# Patient Record
Sex: FEMALE | Race: WHITE | NOT HISPANIC OR LATINO | URBAN - METROPOLITAN AREA
[De-identification: names, ages, dates, MRNs, and addresses within clinical notes are randomized per-mention and may not be internally consistent; named-entity substitution may affect disease eponyms.]

---

## 2024-09-27 ENCOUNTER — INPATIENT (INPATIENT)
Facility: HOSPITAL | Age: 24
LOS: 2 days | Discharge: HOME | DRG: 955 | End: 2024-09-30
Attending: STUDENT IN AN ORGANIZED HEALTH CARE EDUCATION/TRAINING PROGRAM | Admitting: STUDENT IN AN ORGANIZED HEALTH CARE EDUCATION/TRAINING PROGRAM
Payer: MEDICAID

## 2024-09-27 VITALS — DIASTOLIC BLOOD PRESSURE: 73 MMHG | HEART RATE: 104 BPM | SYSTOLIC BLOOD PRESSURE: 118 MMHG

## 2024-09-27 DIAGNOSIS — O26.899 OTHER SPECIFIED PREGNANCY RELATED CONDITIONS, UNSPECIFIED TRIMESTER: ICD-10-CM

## 2024-09-27 LAB
ABO RH CONFIRMATION: SIGNIFICANT CHANGE UP
APPEARANCE UR: CLEAR — SIGNIFICANT CHANGE UP
BASOPHILS # BLD AUTO: 0.05 K/UL — SIGNIFICANT CHANGE UP (ref 0–0.2)
BASOPHILS NFR BLD AUTO: 0.4 % — SIGNIFICANT CHANGE UP (ref 0–1)
BILIRUB UR-MCNC: NEGATIVE — SIGNIFICANT CHANGE UP
COLOR SPEC: YELLOW — SIGNIFICANT CHANGE UP
DIFF PNL FLD: NEGATIVE — SIGNIFICANT CHANGE UP
EOSINOPHIL # BLD AUTO: 0.06 K/UL — SIGNIFICANT CHANGE UP (ref 0–0.7)
EOSINOPHIL NFR BLD AUTO: 0.5 % — SIGNIFICANT CHANGE UP (ref 0–8)
GLUCOSE UR QL: NEGATIVE MG/DL — SIGNIFICANT CHANGE UP
HCT VFR BLD CALC: 38.5 % — SIGNIFICANT CHANGE UP (ref 37–47)
HGB BLD-MCNC: 12.7 G/DL — SIGNIFICANT CHANGE UP (ref 12–16)
IMM GRANULOCYTES NFR BLD AUTO: 0.6 % — HIGH (ref 0.1–0.3)
KETONES UR-MCNC: 80 MG/DL
LEUKOCYTE ESTERASE UR-ACNC: NEGATIVE — SIGNIFICANT CHANGE UP
LYMPHOCYTES # BLD AUTO: 2.64 K/UL — SIGNIFICANT CHANGE UP (ref 1.2–3.4)
LYMPHOCYTES # BLD AUTO: 22.5 % — SIGNIFICANT CHANGE UP (ref 20.5–51.1)
MCHC RBC-ENTMCNC: 28.8 PG — SIGNIFICANT CHANGE UP (ref 27–31)
MCHC RBC-ENTMCNC: 33 G/DL — SIGNIFICANT CHANGE UP (ref 32–37)
MCV RBC AUTO: 87.3 FL — SIGNIFICANT CHANGE UP (ref 81–99)
MONOCYTES # BLD AUTO: 0.7 K/UL — HIGH (ref 0.1–0.6)
MONOCYTES NFR BLD AUTO: 6 % — SIGNIFICANT CHANGE UP (ref 1.7–9.3)
NEUTROPHILS # BLD AUTO: 8.21 K/UL — HIGH (ref 1.4–6.5)
NEUTROPHILS NFR BLD AUTO: 70 % — SIGNIFICANT CHANGE UP (ref 42.2–75.2)
NITRITE UR-MCNC: NEGATIVE — SIGNIFICANT CHANGE UP
NRBC # BLD: 0 /100 WBCS — SIGNIFICANT CHANGE UP (ref 0–0)
PH UR: 7 — SIGNIFICANT CHANGE UP (ref 5–8)
PLATELET # BLD AUTO: 232 K/UL — SIGNIFICANT CHANGE UP (ref 130–400)
PMV BLD: 11.6 FL — HIGH (ref 7.4–10.4)
PRENATAL SYPHILIS TEST: SIGNIFICANT CHANGE UP
PROT UR-MCNC: NEGATIVE MG/DL — SIGNIFICANT CHANGE UP
RBC # BLD: 4.41 M/UL — SIGNIFICANT CHANGE UP (ref 4.2–5.4)
RBC # FLD: 13.2 % — SIGNIFICANT CHANGE UP (ref 11.5–14.5)
SP GR SPEC: 1.01 — SIGNIFICANT CHANGE UP (ref 1–1.03)
UROBILINOGEN FLD QL: 0.2 MG/DL — SIGNIFICANT CHANGE UP (ref 0.2–1)
WBC # BLD: 11.73 K/UL — HIGH (ref 4.8–10.8)
WBC # FLD AUTO: 11.73 K/UL — HIGH (ref 4.8–10.8)

## 2024-09-27 PROCEDURE — 85025 COMPLETE CBC W/AUTO DIFF WBC: CPT

## 2024-09-27 PROCEDURE — 86592 SYPHILIS TEST NON-TREP QUAL: CPT

## 2024-09-27 PROCEDURE — 59050 FETAL MONITOR W/REPORT: CPT

## 2024-09-27 PROCEDURE — 36415 COLL VENOUS BLD VENIPUNCTURE: CPT

## 2024-09-27 PROCEDURE — 81003 URINALYSIS AUTO W/O SCOPE: CPT

## 2024-09-27 PROCEDURE — 80354 DRUG SCREENING FENTANYL: CPT

## 2024-09-27 PROCEDURE — 86901 BLOOD TYPING SEROLOGIC RH(D): CPT

## 2024-09-27 PROCEDURE — 86900 BLOOD TYPING SEROLOGIC ABO: CPT

## 2024-09-27 PROCEDURE — 86850 RBC ANTIBODY SCREEN: CPT

## 2024-09-27 PROCEDURE — 80307 DRUG TEST PRSMV CHEM ANLYZR: CPT

## 2024-09-27 RX ORDER — ONDANSETRON HCL/PF 4 MG/2 ML
4 VIAL (ML) INJECTION EVERY 6 HOURS
Refills: 0 | Status: DISCONTINUED | OUTPATIENT
Start: 2024-09-27 | End: 2024-09-28

## 2024-09-27 RX ORDER — HEPARIN SOD,PORK IN 0.45% NACL 5K/1000 ML
250 INTRAVENOUS SOLUTION INTRAVENOUS
Refills: 0 | Status: DISCONTINUED | OUTPATIENT
Start: 2024-09-27 | End: 2024-09-28

## 2024-09-27 RX ORDER — OXYTOCIN/RINGER'S LACTATE 20/500ML
2 PLASTIC BAG, INJECTION (ML) INTRAVENOUS
Qty: 30 | Refills: 0 | Status: DISCONTINUED | OUTPATIENT
Start: 2024-09-27 | End: 2024-09-28

## 2024-09-27 RX ORDER — NALOXONE HYDROCHLORIDE 0.4 MG/ML
0.1 INJECTION, SOLUTION INTRAMUSCULAR; INTRAVENOUS; SUBCUTANEOUS
Refills: 0 | Status: DISCONTINUED | OUTPATIENT
Start: 2024-09-27 | End: 2024-09-28

## 2024-09-27 RX ORDER — OXYTOCIN/RINGER'S LACTATE 20/500ML
0.67 PLASTIC BAG, INJECTION (ML) INTRAVENOUS
Qty: 20 | Refills: 0 | Status: DISCONTINUED | OUTPATIENT
Start: 2024-09-27 | End: 2024-09-27

## 2024-09-27 RX ORDER — SODIUM CHLORIDE IRRIG SOLUTION 0.9 %
1000 SOLUTION, IRRIGATION IRRIGATION
Refills: 0 | Status: DISCONTINUED | OUTPATIENT
Start: 2024-09-27 | End: 2024-09-28

## 2024-09-27 RX ORDER — OXYTOCIN/RINGER'S LACTATE 20/500ML
167 PLASTIC BAG, INJECTION (ML) INTRAVENOUS
Qty: 30 | Refills: 0 | Status: DISCONTINUED | OUTPATIENT
Start: 2024-09-27 | End: 2024-09-28

## 2024-09-27 NOTE — PROCEDURE NOTE - ADDITIONAL PROCEDURE DETAILS
Thorough discussion of patient's history, as indicated above.  Discussed risks of epidural, including PDPH, inadequate analgesia occasionally requiring epidural catheter replacement, bleeding, infection and spinal cord injury.  Patient expressed understanding of these risks, signed informed consent and wishes to proceed with epidural catheter insertion.  Lumbar epidural performed at L3-4. Standard ASA monitors including BP, pulse oximetry, FHR. Sterile gloves, chlorhexidine prep. 1% lidocaine for local infiltration. 17g Touhy. BABAR to Air at 8 cm.  27G Gray spinal needle inserted through epidural needle.  +CSF/Negative heme or paresthesias.  1cc of 0.25% bupivacaine injected easily.  Gray needle removed.  Epidural catheter threaded easily to 13 cm. Touhy needle removed. Catheter secured in place. Aspiration negative for blood/CSF. Test dose consisting of 3ml 1.5% lidocaine with epinephrine was negative. Patient tolerated procedure, was hemodynamically stable throughout and did not complain of pain or paresthesias. T10 level bilaterally.     PCEA: Epidural infusion consisting of 250ml 0.0625% bupivacaine with fentanyl 2mcg/ml infusing at 10ml/hr. 5cc demand dose , 15min lock out Thorough discussion of patient's history, as indicated above.  Discussed risks of epidural, including PDPH, inadequate analgesia occasionally requiring epidural catheter replacement, bleeding, infection and spinal cord injury.  Patient expressed understanding of these risks, signed informed consent and wishes to proceed with epidural catheter insertion.  Lumbar epidural performed at L3-4. Standard ASA monitors including BP, pulse oximetry, FHR. Sterile gloves, chlorhexidine prep. 1% lidocaine for local infiltration. 17g Touhy. BABAR to Air at 8 cm.  27G Gray spinal needle inserted through epidural needle.  +CSF/Negative heme or paresthesias.  1cc of 0.25% bupivacaine injected easily.  Gray needle removed.  Epidural catheter threaded easily to 13 cm. Touhy needle removed. Catheter secured in place. Aspiration negative for blood/CSF. Test dose consisting of 3ml 1.5% lidocaine with epinephrine was negative. Patient tolerated procedure, was hemodynamically stable throughout and did not complain of pain or paresthesias. T10 level bilaterally.     PCEA: Epidural infusion consisting of 250ml 0.0625% bupivacaine with fentanyl 2mcg/ml infusing at 10ml/hr. 5cc demand dose , 15min lock out    C/O   L sided pain unrelieved with epidural  Discussed redo: CSE  CSE:  Skin prepped  L2-3  17/27g  BABAR 6cm  Bupivacaine 2.5mg  Catheter threaded  6cm in epidural space  -ve aspiration  Analgesia T 10 R=L  VSS  FH 140s

## 2024-09-27 NOTE — OB RN PATIENT PROFILE - THE IMPORTANCE OF THE NEWBORN'S COMFORT AND THERMOREGULATION DURING SKIN TO SKIN: ANY PART OF INFANT SKIN NOT TOUCHING PARENT'S SKIN IS TO BE COVERED BY A BLANKET.
No showering, swimming, sleeping in contacts     Make sure to replace contact lens case and solution every 3 months          [facility]  [address] [place]  [address] [phone]                                                                                         Fax [phone]                  [facility]                                       [address] [place]  [address] [phone]    Fax [phone]    [email or web address]      CONTACT LENS PRESCRIPTION AND MANAGEMENT AGREEMENT    The best contact lens care includes quality materials and professional services provided by experienced doctors.     At [facility], we are dedicated to providing the highest level of contact lens services to our patients. For this reason, we offer complete contact lens care which includes the professional fitting services and materials. This agreement outlines the policy at [facility] for our contact lens services.      1.  The type of lenses recommended for your eyes are:     2.  The fitting charge includes the initial fitting appointment and followup visits during the adaptation period lasting up to three months.       Additional charges that may be incurred during the 3 month term of this fitting agreement:   A.  If it is determined during the adaptation period that your eyes require a more complex lens, an additional fee for the lenses and professional services will be due.    B.  If you lose or damage your contact lens(es), you will be expected to pay the full cost of a replacement lens.     3.  If contact lenses are ordered through [employer], full payment for the contact lenses will be due at time of order.     4.  REFUND POLICY:  Professional prescribing fees are non-refundable. In the event contact lenses are discontinued, the cost of the soft contact lens materials may be refunded if the boxes are  unmarked, unopened, and in acceptable condition within 60 days from the order date. No refunds will be given after that time period.     5.  After this prescription agreement expires (3 months from initial fitting of the contact lenses), you will be expected to pay for additional professional services at the time they are performed.      6.  FOLLOW-UP CARE:  Contact lens patients require special care and more frequent examinations because of the increased risk of eye infections and other complications which could lead to severe vision loss. Our office will notify you of the proper follow up required for your particular contact lens.    7.  In addition, annual comprehensive dilated eye examinations are necessary to ensure eye health. Our office requires yearly examinations be performed here to maintain a valid contact lens prescription. If an examination has not been completed within the previous 12 months, a contact lens prescription or supply will not be dispensed until a dilated examination is completed.     8.  IN THE EVENT OF AN EMERGENCY:  If you experience any of the following symptoms, you should immediately remove your contact lens and call our office at [phone], [phone], or [phone] after 5:00 p.m. and weekends.      Eye pain or unusual irritation  Cloudy or decreased vision  Redness  Sensitivity to light  Tearing or discharge  Any disturbance in vision or eye comfort    9.  RELEASE OF CONTACT LENS PRESCRIPTION:  Contact lenses are a medical device that can only be dispensed by a valid prescription. Contact lenses should be treated with the same caution as prescription medication. Eye health may be compromised with an improperly filled prescription and lack of follow-up care. We, therefore, highly recommend you receive your contacts only from [facility].      10.  CONTACT LENS FITTING ELSEWHERE:  Our office will not accept responsibility for contact lenses fit or received from any  establishment other than those from [place]. Any office visits for lenses fit or received elsewhere will be subject to the usual office visit fee.     11.  INFORMATION FOR MONOVISION CONTACT LENS WEARERS:  Monovision contacts are fit so that one eye sees clearly at distance and one eye sees clearly up close. This system of contact lens fitting will not harm your eyes. You will meet state requirements for driving; however, you should be aware driving with only one eye seeing clearly at distance may impair your ability to  distances. Your vision for driving will not be as clear as it would be if both eyes were used together. You should not drive until you have adjusted to your monovision contacts. We suggest you wear your glasses, distance contact lenses in each eye, or glasses designed to be worn over contact lenses to improve distance vision while driving.      12.  No verbal agreement or arrangements will supersede this written agreement. Any modifications to this agreement must be made in writing by a doctor or his or her designees.     CONTACT LENS CARE AND HANDLING    Proper contact lens care is necessary to minimize the risk of eye injury and vision loss and to maintain comfort.      CONTACT LENS SOLUTIONS TO BE USED FOR CLEANING AND DISINFECTION:      This product has been prescribed specifically for your lenses and eyes. Do not change or substitute brand unless you check with our office first. Use of improper solutions may result in lens damage or eye irritation.      GENERAL LENS CARE:    Basic Instructions:  Always wash and rinse your hands before handling your contact lenses. Dry your hands thoroughly with a lint free towel.    Your lenses must be BOTH cleaned and disinfected every time you remove them. Cleaning is necessary to remove build-up and film from the lens surface. The combination of CLEANING AND DISINFECTING has been shown to prevent the growth of microorganisms.   Clean one lens first  (always the same lens first to avoid mix-ups) and rinse the lens thoroughly. Follow the directions provided with the recommended cleaning solution.    Place the lens in the correct chamber of the lens storage case. Repeat the procedure for the second lens.   Disinfect your lenses using the system recommended.   To store your lenses, disinfect and leave them in the closed/unopened storage case until ready to wear.   If the lenses have been stored in the unopened storage case for more than 7 days, re-disinfect them before wearing by discarding the old solution and filling the lens chambers with fresh solution.   To prevent contamination and to help avoid serious eye injury, always empty and rinse your contact lens case after each use with fresh lens storage solution and allow to air dry. Do not reuse the solution left in your case; fresh storage solution mostly every night and. Never clean your case with water, soaps, detergents, or other cleaning agents. If the case cannot be properly cleaned, it should be replaced. Cases should be replaced every 3 months no matter what.      To Rewet a Dried Lens:    Handle the lens carefully.   Place then lens in its storage case and soak the lens in the recommended cleaning and disinfecting solution for at least 6 hours.   Clean and disinfect the rewetted (rehydrated) lens using the lens care system recommended by your eye care practitioner.   If after soaking, the lens does not become soft, DO NOT USE THE LENS, and consult our office.     Care For a Sticking Lens:  If the lens sticks (stops moving) on the eye, apply 2-3 drops of a recommended lubricating solution. If the lens continues to stick, consult your eye care practitioner.      PRECAUTIONS:      Always follow recommended lens care systems for your contact lens. Use lens care solutions recommended by our office and carefully follow the recommended directions.    Do not use hard contact lens solutions which are not  indicated for soft contact lenses.   Always use FRESH rinsing, disinfecting, and storing solutions daily.    Do not mix or alternate thermal (heat) and chemical (not heat) lens care systems.   Never use a chemical disinfecting solution with heat unless recommended in the product labeling.    Do not use saliva or anything other than the recommended solutions to wet your lenses.   Always keep the lenses completely immersed in the recommended storage solution when the lenses are not being worn. Prolonged periods of drying will dehydrate your lenses.   Do not use any water with soft contact lenses.   The lens must move freely on the eye for the continued health of the eye. If your lens sticks or does not move on the eye consult our office.   Always wash and rinse your hands before you handle your lenses. Eye irritation may result if cosmetics, lotions, soaps, creams, or deodorants come in contact with your lenses and if the lenses are contaminated by infectious or non-infectious dirt.   Avoid using aerosol products such as hair spray while wearing your lenses. If sprays are used, keep your eyes closed until the spray has settled.   DO NOT WEAR CONTACT LENSES WHILE SLEEPING.   Avoid all harmful or irritating vapors and fumes while wearing your lenses.  Do not swim with your lenses in place.   Never use tweezers or other tools to remove your lens from the storage case. Pour the lens into your hand to prevent ripping.    Do not touch the lens with your fingernails.   Always consult our office before using any eyedrops in your eyes.   Always inform your primary care physician that you wear contact lenses.   Always inform your employer that you wear contact lenses. Some jobs require the use of eye protection or require that you not wear contact lenses.      WEARING SCHEDULE:     DAY  HOURS  1.  4-6  2.  6-8  3.  8-10  4.  10-12  5.  12-14  6.   14-16  7.   16-18    Please have your contact lenses in your eyes for at least 4  hours before your next appointment. If you cannot keep this appointment, please call to reschedule.        IN THE BEGINNING IT IS NORMAL IF:     1.  Your lenses itch or feel unusual.    2.  One lens is more noticeable than the other.    3.  Your vision seems less clear than with glasses.     4.  One eye sees better than the other.     5.  You have trouble applying or removing your lenses.      REMOVE YOUR LENSES IF:      1.  You develop eye pain or irritation.     2.  You develop unusually cloudy or decreased vision.     3.  You experience a decrease in vision that does not clear up.     4.  You become sensitive to light.    5.  You experience watering or discharge from the eyes.     6.  You experience redness of the eyes.    7.  You suspect something is wrong.      If the discomfort or problem stops once you remove the lens, then look closely at the lens.   If the lens is in any way damaged. DO NOT put the lens back on the eye. Place the lens in the storage case and contact our office.    If the lens has dirt, an eyelash, or other foreign body on it, or the problem stops and lens appears undamaged, thoroughly clean, rinse and disinfect the lens, then re-insert.    If the problem continues, IMMEDIATELY remove the lens and contact our office.       If any of the above symptoms occur, a serious condition such as infection, corneal ulcer, corneal edema, giant papillary conjunctivitis, or iritis may be the cause. Immediately remove your lenses and seek prompt treatment to avoid serious eye injury and vision loss. Call [facility] at [phone], [phone], or [phone] after 5:00 p.m. and weekends.        CONTACT LENS COMPATIBLE MAKE-UP    MASCARA   Brand Names   Almay One Coat/One Coat Waterproof; Longest Lashes Mascara    Super Rich Mascara    Conditioning Plus; Stay Smooth Mascara    Amazing Lash/Amazing Lash Waterproof       M[place]/State-of-the-Art Mascara        Clinique Naturally Glossy Mascara; Supermascara;    Gentle Waterproof Mascara;     Full Potential Long Stemmed lashes       Cover Girl Extremely Gentle       Cynacon Ocusoft Mascara       Lancome Tendrecils       L'Oreal Color Endour; Lash Out/Waterproof/Lash Out    Feather Lash; Le Grand Curl;    Waterproof Voluminous Mascara       Ekta Illegal Lengths Mascara;    Lash by Lash Washable & Waterproof;    Volum' Express; Wonder Curl; Full N' Soft;    Great Lash; Dial-A-Lash; Ultra Big Ultra Lash       Max Factor 2000 Calorie; Lash Enhance; X-L-H-E-T-C-H       Physician's Formula Plentiful Thickening Mascara;    Aqua-Wear Cond. Waterproof Mascara    LipLash Spalsh; Month 2 Month;    Brightening & Curling       Remmel Endless Lash; Exaggerate       Revlon Colorstay Lashcolor; Everylash Curling Mascara     EYE CREAMS   Brand Names   Almay A.M./P.M. Intensive Eye Treatment;    Moisture Creme; Stress Eye Gelc       Borghese Fluido Protettino       Clinique Daily Eye Benefits; Daily Eye Saver       Igenics Igiene       Lancome Expressibr Eyecream       Physicians Formula Luxury Eye Cream; Oil Free Nourishing Eye Gel     SKIN CARE   Brand Names   Clinique Skin Care System       Cynacon OcuSoft Facial/Skin Cleanser       Physicians Formula Gentle Cleansing Lotion; Deep Pore Cleansing Gel       Vision Pharmaceuticals Vit-A-Clenz - Liquid Cleanser;    Vit-A-Clenz - Lotion Cleanser;    Vit-A-Spritzx - Skin Enhancer    Vit-A-Silk - Lotion Moisturizer     CONCEALERS    Brand Names   Clinique Advanced Concealer;    City Cover Compact Concealer SPF 15;    Soft Conceal Corrector; Quick Corrector       L'Oreal Visible Lift Eye Line Minimizing Concealer     EYE PENCILS/EYELINERS   Brand Names   Almay Wet Proof Shadowliner; Eye-Defining Liquid Liner;    Easy Eyes Self Sharpening Eye Pencil; I-Liner Liquid Eyeliner    Amazing Lasting Eye Pencil; One Coat Gel Eye Pencil        Borghese Eye Accents Pencil       Starr Double Effect Eye Pencil       Clinique Quick Eyes; Eye-Shading Pencil; Water-Resistant Eye Liner    Touch Liner; Shadowliner       L'Oreal Super Liner; Lineur Intense       Ekta Expert Eyes Brow and  & Liner Pencil; Lineworks Ultra Liner Liquid; Eye Express       Physicians Formula Eye definer Automatic Eye Pencil: Gentle Wear Eye Pencil; Fineline; Eyebrightener Duo Pencil; Eyebrightener Pencil; Eyeliner; Eyeliner Palette Multi-Colored Cake Liner       Revlon Eye opener Duo-Tone Line     EYE SHADOWS   Brand Names   Almay Amazing Lasting Eyecolor; Liquid-to-Powder Eye Tint; Easy-to-Wear Longlasting EyeColor       Borghese Shadow Deandre (Single, Duo, Trio); Shadow Deandre Crema       Clinque Soft-Pressed Eye Shadow; Touch Base for Eyes; Smudgesicle       L'Oreal Soft Effects       Ekta Revitalizing Eye Shadow; Cool Effect Cream Eyecolor       Physicians Formula Matte Collection Eye Shadow       Revlon Wet/Dry Shadow     EYE MAKE-UP REMOVERS   Brand Names   Allergen Lids & Lashes (cleaning pads)       Almay Non-Oily Eye makeup Remover; Moisturizing Eye Makeup Remover       Borghese Gel Delicato       CIBAVision EYEscrub       Clarins Emulsion Douce Demaquillante; Lotion Douce Demaquillante       Clinique Extremely Gentle Eye Makeup Remover; Rinse-off Eye Makeup Solvent; Take The day Off       Cynacon OcuSoft Eye Make-Up Remover       Gilda Lauder Gentle Eye Makeup Remover; Lip and Eye Makeup Remover For Long Wear Formula       Lancome Efacil; Tendrecils       L'Oreal Plentitude Refreshing Eye Make-Up Remover       Physicians Formula Eye Makeup Remover Lotion; Eye Makeup Remover Pads; Oil Free Makeup Remover Pads        Statement Selected

## 2024-09-27 NOTE — OB PROVIDER H&P - ATTENDING COMMENTS
23y/o  at 41w2d, BPP 6/8 in office, for IOL, reassuring maternal and fetal status at this time.  A pos  GBS neg  Mumps NI    uncomplicated prenatal course    0/L/high    EFW: 3300g    tracing cat 1  TOCO: none    Plan:  Admit to LD  Cytotec and then AROM/pitocin  Continuous TOCO/EFM  IVF and labs  MMR postpartum

## 2024-09-27 NOTE — OB PROVIDER H&P - NSLOWPPHRISK_OBGYN_A_OB
No previous uterine incision/[place] Pregnancy/No known bleeding disorder/No history of postpartum hemorrhage

## 2024-09-27 NOTE — OB RN TRIAGE NOTE - NSHISCREENINGQ1_ED_A_ED
RANITIDINE 150MG CAPSULES  Will file in chart as: RANITIDINE  MG Oral Cap  TAKE 1 CAPSULE(150 MG) BY MOUTH DAILY       Disp: 90 capsule Refills: 0    Class: Normal Start: 6/9/2018   Originally ordered: 1 year ago by Taina Martell MD  Last refill No

## 2024-09-27 NOTE — OB PROVIDER H&P - ASSESSMENT
23y/o  at 41w2d, BPP 6/8 in office, for IOL, reassuring maternal and fetal status at this time.  A pos  GBS neg  Mumps NI    Plan:  Admit to LD  Cytotec and then AROM/pitocin  Continuous TOCO/EFM  IVF and labs  MMR postpartum

## 2024-09-27 NOTE — PROCEDURE NOTE - NSINFORMCONSENT_GEN_A_CORE
Alevism/Benefits, risks, and possible complications of procedure explained to patient/caregiver who verbalized understanding and gave verbal consent.

## 2024-09-27 NOTE — OB RN PATIENT PROFILE - BP NONINVASIVE SYSTOLIC (MM HG)
What Type Of Note Output Would You Prefer (Optional)?: Bullet Format
Hpi Title: Evaluation of Skin Lesions
How Severe Are Your Spot(S)?: moderate
Have Your Spot(S) Been Treated In The Past?: has not been treated
118

## 2024-09-27 NOTE — OB PROVIDER H&P - HISTORY OF PRESENT ILLNESS
23y/o  at 41w2d, RODRIGO 24 by 1st tri sono, sent to L&D from PMD's office for BPP abnormalities. Pt feels well today, presented to PMD's office at 1100 and BPP in office was 4/8, no movement or tone, nonreassuring tracing. Pt currently feeling fetal movement, denies CTX, LOF, VB. GBS neg.     Uncomplicated pregnancy.

## 2024-09-27 NOTE — OB RN TRIAGE NOTE - FALL HARM RISK - UNIVERSAL INTERVENTIONS
Bed in lowest position, wheels locked, appropriate side rails in place/Call bell, personal items and telephone in reach/Instruct patient to call for assistance before getting out of bed or chair/Non-slip footwear when patient is out of bed/[place] to call system/Physically safe environment - no spills, clutter or unnecessary equipment/Purposeful Proactive Rounding/Room/bathroom lighting operational, light cord in reach

## 2024-09-27 NOTE — OB PROVIDER H&P - NSHPLABSRESULTS_GEN_ALL_CORE
Labs:   3/6/24  HIV NR  UCx +e coli  Varicella immune  Measles immune  Rubella immune  Mumps NI  A+  Ab neg  HBV NR  HCV NR  RPR NR  H/H: 12.0/35.9  HbA1c 4.7    6/4/24  GCT 93  H/H: 11.3/33.5    7/11/24  HIV NR  UCx neg  HBV NR  RPR NR    8/22/24  GBS neg

## 2024-09-27 NOTE — OB PROVIDER H&P - NSHPPHYSICALEXAM_GEN_ALL_CORE
Physical exam:    Vital Signs Last 24 Hrs  T(F): 98 (27 Sep 2024 14:36), Max: 98 (27 Sep 2024 14:31)  HR: 104 (27 Sep 2024 14:36) (104 - 104)  BP: 118/73 (27 Sep 2024 14:36) (118/73 - 118/73)  RR: 20 (27 Sep 2024 14:36) (20 - 20)  SpO2: --    Gen: AAOx3, NAD  Heart: RRR, S1 S2 WNL  Lungs: CTAB  Abdomen: Soft, nontender, no distension, gravid  Ext: No calf tenderness, no swelling    EFM: 130, mod nila, +accels, -decels  toco: none  SVE: 0/0/-3  BSS: vertex, anterior placenta, no 2x2 pocket, BPP 6/8 (no fluid)  EFW by leopold: 3500

## 2024-09-28 LAB — L&D DRUG SCREEN, URINE: SIGNIFICANT CHANGE UP

## 2024-09-28 RX ORDER — PRAMOXINE HYDROCHLORIDE 10 MG/ML
1 LOTION TOPICAL EVERY 4 HOURS
Refills: 0 | Status: DISCONTINUED | OUTPATIENT
Start: 2024-09-28 | End: 2024-09-30

## 2024-09-28 RX ORDER — GENTAMICIN 10 MG/ML
400 INJECTION, SOLUTION INTRAMUSCULAR; INTRAVENOUS EVERY 24 HOURS
Refills: 0 | Status: DISCONTINUED | OUTPATIENT
Start: 2024-09-28 | End: 2024-09-28

## 2024-09-28 RX ORDER — ACETAMINOPHEN 325 MG
1000 TABLET ORAL ONCE
Refills: 0 | Status: COMPLETED | OUTPATIENT
Start: 2024-09-28 | End: 2024-09-28

## 2024-09-28 RX ORDER — TETANUS TOXOID, REDUCED DIPHTHERIA TOXOID AND ACELLULAR PERTUSSIS VACCINE, ADSORBED 5; 2.5; 8; 8; 2.5 [IU]/.5ML; [IU]/.5ML; UG/.5ML; UG/.5ML; UG/.5ML
0.5 SUSPENSION INTRAMUSCULAR ONCE
Refills: 0 | Status: DISCONTINUED | OUTPATIENT
Start: 2024-09-28 | End: 2024-09-30

## 2024-09-28 RX ORDER — SOAP/LANOLIN
1 BAR TOPICAL EVERY 4 HOURS
Refills: 0 | Status: DISCONTINUED | OUTPATIENT
Start: 2024-09-28 | End: 2024-09-30

## 2024-09-28 RX ORDER — OXYCODONE HYDROCHLORIDE 30 MG/1
5 TABLET, FILM COATED, EXTENDED RELEASE ORAL ONCE
Refills: 0 | Status: DISCONTINUED | OUTPATIENT
Start: 2024-09-28 | End: 2024-09-30

## 2024-09-28 RX ORDER — OXYTOCIN/RINGER'S LACTATE 20/500ML
2 PLASTIC BAG, INJECTION (ML) INTRAVENOUS
Qty: 30 | Refills: 0 | Status: DISCONTINUED | OUTPATIENT
Start: 2024-09-28 | End: 2024-09-28

## 2024-09-28 RX ORDER — KETOROLAC TROMETHAMINE 10 MG/1
30 TABLET, FILM COATED ORAL ONCE
Refills: 0 | Status: DISCONTINUED | OUTPATIENT
Start: 2024-09-28 | End: 2024-09-30

## 2024-09-28 RX ORDER — OXYTOCIN/RINGER'S LACTATE 20/500ML
167 PLASTIC BAG, INJECTION (ML) INTRAVENOUS
Qty: 30 | Refills: 0 | Status: DISCONTINUED | OUTPATIENT
Start: 2024-09-28 | End: 2024-09-30

## 2024-09-28 RX ORDER — AMPICILLIN TRIHYDRATE 125 MG/5ML
2 SUSPENSION, RECONSTITUTED, ORAL (ML) ORAL ONCE
Refills: 0 | Status: COMPLETED | OUTPATIENT
Start: 2024-09-28 | End: 2024-09-28

## 2024-09-28 RX ORDER — MAGNESIUM HYDROXIDE 400 MG/5ML
30 SUSPENSION, ORAL (FINAL DOSE FORM) ORAL
Refills: 0 | Status: DISCONTINUED | OUTPATIENT
Start: 2024-09-28 | End: 2024-09-30

## 2024-09-28 RX ORDER — DIPHENHYDRAMINE HCL 12.5MG/5ML
25 LIQUID (ML) ORAL EVERY 6 HOURS
Refills: 0 | Status: DISCONTINUED | OUTPATIENT
Start: 2024-09-28 | End: 2024-09-30

## 2024-09-28 RX ORDER — SODIUM CHLORIDE 0.9 % (FLUSH) 0.9 %
3 SYRINGE (ML) INJECTION EVERY 8 HOURS
Refills: 0 | Status: DISCONTINUED | OUTPATIENT
Start: 2024-09-28 | End: 2024-09-30

## 2024-09-28 RX ORDER — METHYLERGONOVINE 0.2 MG/1
0.2 TABLET ORAL ONCE
Refills: 0 | Status: DISCONTINUED | OUTPATIENT
Start: 2024-09-28 | End: 2024-09-28

## 2024-09-28 RX ORDER — OXYCODONE HYDROCHLORIDE 30 MG/1
5 TABLET, FILM COATED, EXTENDED RELEASE ORAL
Refills: 0 | Status: DISCONTINUED | OUTPATIENT
Start: 2024-09-28 | End: 2024-09-30

## 2024-09-28 RX ORDER — ACETAMINOPHEN 325 MG
975 TABLET ORAL
Refills: 0 | Status: DISCONTINUED | OUTPATIENT
Start: 2024-09-28 | End: 2024-09-30

## 2024-09-28 RX ORDER — MEASLES,MUMPS,RUBELLA VACC/PF 12500/0.5
0.5 VIAL (EA) SUBCUTANEOUS ONCE
Refills: 0 | Status: COMPLETED | OUTPATIENT
Start: 2024-09-28 | End: 2024-09-28

## 2024-09-28 RX ORDER — ANTI-ITCH CREAM 1 G/100G
1 OINTMENT TOPICAL EVERY 6 HOURS
Refills: 0 | Status: DISCONTINUED | OUTPATIENT
Start: 2024-09-28 | End: 2024-09-30

## 2024-09-28 RX ORDER — AMPICILLIN TRIHYDRATE 125 MG/5ML
SUSPENSION, RECONSTITUTED, ORAL (ML) ORAL
Refills: 0 | Status: DISCONTINUED | OUTPATIENT
Start: 2024-09-28 | End: 2024-09-28

## 2024-09-28 RX ORDER — DIBUCAINE 1 %
1 OINTMENT (GRAM) TOPICAL EVERY 6 HOURS
Refills: 0 | Status: DISCONTINUED | OUTPATIENT
Start: 2024-09-28 | End: 2024-09-30

## 2024-09-28 RX ORDER — PRENATAL VIT,CAL 76/IRON/FOLIC 29 MG-1 MG
1 TABLET ORAL DAILY
Refills: 0 | Status: DISCONTINUED | OUTPATIENT
Start: 2024-09-28 | End: 2024-09-30

## 2024-09-28 RX ADMIN — Medication 125 MILLILITER(S): at 00:38

## 2024-09-28 RX ADMIN — Medication 2 MILLIUNIT(S)/MIN: at 12:13

## 2024-09-28 RX ADMIN — METHYLERGONOVINE 0.2 MILLIGRAM(S): 0.2 TABLET ORAL at 22:46

## 2024-09-28 RX ADMIN — GENTAMICIN 140 MILLIGRAM(S): 10 INJECTION, SOLUTION INTRAMUSCULAR; INTRAVENOUS at 19:55

## 2024-09-28 RX ADMIN — Medication 1000 MILLIGRAM(S): at 20:00

## 2024-09-28 RX ADMIN — Medication 200 GRAM(S): at 19:30

## 2024-09-28 RX ADMIN — Medication 2 MILLIUNIT(S)/MIN: at 00:38

## 2024-09-28 RX ADMIN — Medication 400 MILLIGRAM(S): at 19:30

## 2024-09-28 NOTE — OB RN DELIVERY SUMMARY - NS_SEPSISRSKCALC_OBGYN_ALL_OB_FT
EOS calculated successfully. EOS Risk Factor: 0.5/1000 live births ([facility] national incidence); GA=41w3d; Temp=100.22; ROM=18.9; GBS='Negative'; Antibiotics='Broad spectrum antibiotics 2-3.9 hrs prior to birth'

## 2024-09-28 NOTE — OB PROVIDER DELIVERY SUMMARY - NSANESTHESIALABOR_OBGYN_ALL_OB
Patient requesting to change upcoming NP apt to a tele med apt per patient's son request    Requesting tele med apt after 3/16 ok to change?    [phone]   Epidural

## 2024-09-28 NOTE — OB PROVIDER DELIVERY SUMMARY - NSPROVIDERDELIVERYNOTE_OBGYN_ALL_OB_FT
Patient was fully dilated and pushing. Fetal head was OA and restituted to LOT. No nuchal cord noted. The anterior and posterior shoulders delivered, followed by the remaining body atraumatically at 2239. The  was placed on the maternal abdomen and stimulated. Baby gave a good cry on the field. Delayed cord clamping was performed, and then clamped and cut. Cord blood gases collected x2. Pitocin was administered. The placenta delivered intact with membranes at 2242. Uterus massaged, fundus found to be boggy. IM methergine and misoprostol MS given. Cervix, vagina and perineum inspected and a small second degree laceration was noted, repaired using 2-0 vicryl in the usual fashion with good hemostasis.     Viable female infant delivered, weighing 2870g, 6lb 5oz with APGARs 9/9    Lacteration: second degree  QBL: 497cc

## 2024-09-28 NOTE — OB RN DELIVERY SUMMARY - NSSELHIDDEN_OBGYN_ALL_OB_FT
[[email or web address]_OB_FT:[email or web address]],[NS_DeliveryRN_OBGYN_ALL_OB_FT:[email or web address]]

## 2024-09-28 NOTE — CHART NOTE - NSCHARTNOTEFT_GEN_A_CORE
PRIMARY INDICATION FOR C/S     [ ] Labor Abnormality (GO TO LABOR ABNORMALITY SECTION)  [x] FHR Abnormality (GO TO FETAL HEART ABNORMALITY SECTION)  [x] Maternal Request       Was there a risks/benefit discussion: [x] Yes    [ ] No  [ ] Macrosomia (Diabetic: [ ] Yes   [ ] No)       EFW:  [ ] Other:    PATIENT STATUS ON ADMISSION  [ ] Spontaneous labor  [x] Induction       Reason: IOL for BPP abnl (4/8 in office, 6/8 in triage) - anhydramnios  Membranes on admission: [ ] ruptured  [x] Intact  Dilatation on admission: 0/0/-3  Time of admission:  17:00  Cr score on admission:  Dilation at ROM: 50/-2 AROM scant   Dilatation before : 50/-3   Covering Physician: Dr. Jenkins      LABOR ABNORMALITY      [ ] Failed Induction      [ ] oxytocin administered for at least 12-18 hours after membrane rupture without achieving cervical change and regular contractions.              Time of admission: 17:00    Time Oxytocin administered: 00:38     Time ROM: 3:44    [x]Cat II Tracing       [ ]Moderate variability and significant decelerations with > 50% of contractions for 1 hour in latent labor       [] Moderate variability and significant decelerations with > 50% of contractions for 1 hour in active labor with abnormal progress.        [ ] Moderate variability and significant decelerations with > 50% of contractions for 1 hour in second stage of labor with abnormal progress            (1cm decent per hour of pushing).       [ ] Minimal or absent variability and significant decelerations with >50% of contractions for 30 minutes        [ ] Minimal or absent variability without significant decelerations with >50% of contractions for 1 hour        [x] Prolonged deceleration greater than 3 minutes or bradycardia           Comments: 5 minutes prolonged deceleration     Cat2 tracing with prolonged deceleration for 5 minutes. Risks/benefits/ and alternatives were discussed and pt was amendable to  section given current outcomes.     Labor course   23y/o	  41w3d	+/-  IOL for BPP abnl (4/8 in office, 68 in triage) - anhydramnios	  	  "vss/cat1/none 0/0/-3, v, i"	  17:20	cytotec  22:00	CRB 80/80  11:50	epi  0:38	pit  3:44	"CRB out, 50/-2, AROM scant,  FSE"  5:45	6/80/-1  7:40	epi redone  9:45	"50/-3  IUPC amnio, cat 2"  12:13	pit  15:35	"50/-3  FSE replaced, pit off"  		  3500	  11.73>12.7/38.5<232
PGY4 NOTE    Pt seen at bedside for prolonged deceleration. FSE also displaced.   SVE 7/90/-1 FSE replaced.   IVF bolus. Pitocin d/sylvie. Moved from left to right. Recovered to baseline 140bpm with accelerations.
Patient feeling increasing pressuere. Comfortable with epidural in situ. Leaking clear fluid      Gen: NAD  Abd: soft, gravid, non tender  SVE: 5.5/100/-3, IUP placed    EFM: 130/mod nila/+accels/+nila  TOCO: q 5 min    Pitocin running at 2miu/ml    Assessment and Plan:     23y/o  at 41w2d, BPP 6/8 in office, for IOL, reassuring maternal and fetal status at this time.  A pos  GBS neg  Mumps NI      Plan:  Resuscitation with IVF bolus, reposition  Continuous TOCO/EFM  IVF and labs  MMR postpartum
Patient noted to have temp of 100.2, AMP/GENT IV tylenol ordered.    Seen at the bedside, feeling increasing pressure.    Vital Signs Last 24 Hrs  T(C): 37.9 (28 Sep 2024 18:54), Max: 37.9 (28 Sep 2024 18:54)  T(F): 100.22 (28 Sep 2024 18:54), Max: 100.22 (28 Sep 2024 18:54)  HR: 115 (28 Sep 2024 20:57) (67 - 125)  BP: 151/79 (28 Sep 2024 20:57) (77/46 - 151/79)  BP(mean): --  RR: 18 (28 Sep 2024 18:54) (18 - 20)  SpO2: 98% (28 Sep 2024 20:53) (93% - 100%)    Parameters below as of 28 Sep 2024 00:10  Patient On (Oxygen Delivery Method): room air    Gen: NAD  Abd: soft, gravid, non tender  SVE: 10/100/-2    EFM: 160/mod nila/+accels/+nila  TOCO: q 4 min    pit held      23y/o  at 41w2d, BPP 6/8 in office, for IOL, reassuring maternal and fetal status at this time.  A pos  GBS neg  Mumps NI      Plan:  Resuscitation with IVF bolus, reposition  Continuous TOCO/EFM  IVF and labs  MMR postpartum.  Anticipate

## 2024-09-28 NOTE — OB PROVIDER DELIVERY SUMMARY - NSSELHIDDEN_OBGYN_ALL_OB_FT
[[email or web address]_OB_FT:[email or web address]=],[NS_DeliveryRN_OBGYN_ALL_OB_FT:[id number]=]

## 2024-09-29 LAB
BASOPHILS # BLD AUTO: 0.07 K/UL — SIGNIFICANT CHANGE UP (ref 0–0.2)
BASOPHILS NFR BLD AUTO: 0.4 % — SIGNIFICANT CHANGE UP (ref 0–1)
EOSINOPHIL # BLD AUTO: 0.1 K/UL — SIGNIFICANT CHANGE UP (ref 0–0.7)
EOSINOPHIL NFR BLD AUTO: 0.5 % — SIGNIFICANT CHANGE UP (ref 0–8)
HCT VFR BLD CALC: 37.6 % — SIGNIFICANT CHANGE UP (ref 37–47)
HGB BLD-MCNC: 12.3 G/DL — SIGNIFICANT CHANGE UP (ref 12–16)
IMM GRANULOCYTES NFR BLD AUTO: 0.5 % — HIGH (ref 0.1–0.3)
LYMPHOCYTES # BLD AUTO: 12 % — LOW (ref 20.5–51.1)
LYMPHOCYTES # BLD AUTO: 2.27 K/UL — SIGNIFICANT CHANGE UP (ref 1.2–3.4)
MCHC RBC-ENTMCNC: 28 PG — SIGNIFICANT CHANGE UP (ref 27–31)
MCHC RBC-ENTMCNC: 32.7 G/DL — SIGNIFICANT CHANGE UP (ref 32–37)
MCV RBC AUTO: 85.6 FL — SIGNIFICANT CHANGE UP (ref 81–99)
MONOCYTES # BLD AUTO: 1.2 K/UL — HIGH (ref 0.1–0.6)
MONOCYTES NFR BLD AUTO: 6.4 % — SIGNIFICANT CHANGE UP (ref 1.7–9.3)
NEUTROPHILS # BLD AUTO: 15.11 K/UL — HIGH (ref 1.4–6.5)
NEUTROPHILS NFR BLD AUTO: 80.2 % — HIGH (ref 42.2–75.2)
NRBC # BLD: 0 /100 WBCS — SIGNIFICANT CHANGE UP (ref 0–0)
PLATELET # BLD AUTO: 222 K/UL — SIGNIFICANT CHANGE UP (ref 130–400)
PMV BLD: 11.5 FL — HIGH (ref 7.4–10.4)
RBC # BLD: 4.39 M/UL — SIGNIFICANT CHANGE UP (ref 4.2–5.4)
RBC # FLD: 13.3 % — SIGNIFICANT CHANGE UP (ref 11.5–14.5)
WBC # BLD: 18.84 K/UL — HIGH (ref 4.8–10.8)
WBC # FLD AUTO: 18.84 K/UL — HIGH (ref 4.8–10.8)

## 2024-09-29 RX ADMIN — Medication 30 MILLILITER(S): at 20:52

## 2024-09-29 RX ADMIN — Medication 975 MILLIGRAM(S): at 21:23

## 2024-09-29 RX ADMIN — Medication 3 MILLILITER(S): at 05:28

## 2024-09-29 RX ADMIN — Medication 600 MILLIGRAM(S): at 12:45

## 2024-09-29 RX ADMIN — Medication 1 TABLET(S): at 12:13

## 2024-09-29 RX ADMIN — Medication 975 MILLIGRAM(S): at 09:05

## 2024-09-29 RX ADMIN — Medication 3 MILLILITER(S): at 13:47

## 2024-09-29 RX ADMIN — Medication 975 MILLIGRAM(S): at 09:35

## 2024-09-29 RX ADMIN — Medication 600 MILLIGRAM(S): at 12:13

## 2024-09-29 RX ADMIN — Medication 975 MILLIGRAM(S): at 20:53

## 2024-09-29 RX ADMIN — Medication 600 MILLIGRAM(S): at 05:36

## 2024-09-29 RX ADMIN — Medication 975 MILLIGRAM(S): at 15:43

## 2024-09-29 RX ADMIN — Medication 600 MILLIGRAM(S): at 18:33

## 2024-09-29 RX ADMIN — Medication 975 MILLIGRAM(S): at 02:02

## 2024-09-30 VITALS
DIASTOLIC BLOOD PRESSURE: 85 MMHG | TEMPERATURE: 98 F | HEART RATE: 75 BPM | OXYGEN SATURATION: 95 % | SYSTOLIC BLOOD PRESSURE: 120 MMHG | RESPIRATION RATE: 18 BRPM

## 2024-09-30 RX ORDER — ACETAMINOPHEN 325 MG
3 TABLET ORAL
Qty: 0 | Refills: 0 | DISCHARGE
Start: 2024-09-30

## 2024-09-30 RX ADMIN — Medication 975 MILLIGRAM(S): at 08:37

## 2024-09-30 RX ADMIN — Medication 600 MILLIGRAM(S): at 05:57

## 2024-09-30 RX ADMIN — Medication 3 MILLILITER(S): at 13:15

## 2024-09-30 NOTE — DISCHARGE NOTE OB - NS MD DC FALL RISK RISK
No
For information on Fall & Injury Prevention, visit: [email or web address] OR  [email or web address] OR  https://www.cdc.gov/steadi/patient.html

## 2024-09-30 NOTE — PROGRESS NOTE ADULT - ASSESSMENT
23 yo  PPD#2 s/p , doing well.    Routine postpartum care  PO pain medications PRN  Regular diet  Encouraged ambulation and breastfeeding  Plan for d/c home today    
  25 yo  PPD#1 s/p , doing well.    Routine postpartum care  f/u AM CBC  PO pain medications PRN  Encouraged ambulation and breastfeeding  Plan for d/c home tomorrow    
25y/o  at 41w2d, BPP 6/8 in office, for IOL, reassuring maternal and fetal status at this time.  A pos  GBS neg  Mumps NI      Plan:  Begin amnioinfusion  Resuscitation with IVF bolus, reposition  Continuous TOCO/EFM  IVF and labs  MMR postpartum     
  23y/o  at 41w2d, BPP 6/8 in office, for IOL    - Cont EFM/[place]  - IVF hydration  - Pain management w/ epi  - Monitor vitals  - Clear liquid diet  - Pitocin  - IUPC and FSE in place  - Category 2, resolved with resuscitative measures  
23 yo  at 41w3d IOL for BPP abnormalities.     -Monitor FSE and TOCO   -resuscitation as needed   -IVF and labs  -Pain control PRN  -Clear liquid diet as tolerated  -Monitor vitals

## 2024-09-30 NOTE — DISCHARGE NOTE OB - CARE PROVIDER_API CALL
Abilio Kirby  Obstetrics and Gynecology  [address] [place] [address], [address]  [place] [address]  Phone: [phone]  Fax: [phone]  Follow Up Time:

## 2024-09-30 NOTE — DISCHARGE NOTE OB - HOSPITAL COURSE
Patient present for labor pain. Uncomplicated delivery and postpartum stay. Stable to be discharged to home.

## 2024-09-30 NOTE — DISCHARGE NOTE OB - PATIENT PORTAL LINK FT
You can access the FollowMyHealth Patient Portal offered by [facility] by registering at the following website: [email or web address]. By joining [email or web address] FollowMyHealth portal, you will also be able to view your health information using other applications (apps) compatible with our system.

## 2024-09-30 NOTE — PROGRESS NOTE ADULT - SUBJECTIVE AND OBJECTIVE BOX
PGY1 Progress Note    Patient seen and examined at bedside, no current complaints.        Vital Signs Last 24 Hrs  T(C): 36.7 (28 Sep 2024 00:10), Max: 36.7 (27 Sep 2024 14:31)  T(F): 98.06 (28 Sep 2024 00:10), Max: 98.06 (28 Sep 2024 00:10)  HR: 90 (28 Sep 2024 03:48) (72 - 108)  BP: 114/62 (28 Sep 2024 03:48) (89/50 - 135/80)  RR: 20 (28 Sep 2024 00:10) (20 - 20)  SpO2: 97% (28 Sep 2024 03:39) (93% - 100%)    Parameters below as of 28 Sep 2024 00:10  Patient On (Oxygen Delivery Method): room air    EFM: 135/mod/+accels/variable decels  TOCO: irr  SVE: deferred, last /-2 @2204    Labor Course:  17:20	cytotec  22:00	CRB 80/80  11:50	epi  0:38	pit  03:30    pit off (variable decels), patient repositioned, bolus given   03:44    fse placed     Labs:                        12.7   11.73 )-----------( 232      ( 27 Sep 2024 17:58 )             38.5           ABO RH Interpretation: A POS (24 @ 17:58)    Urinalysis Basic - ( 27 Sep 2024 19:36 )    Color: Yellow / Appearance: Clear / S.012 / pH: x  Gluc: x / Ketone: 80 mg/dL  / Bili: Negative / Urobili: 0.2 mg/dL   Blood: x / Protein: Negative mg/dL / Nitrite: Negative   Leuk Esterase: Negative / RBC: x / WBC x   Sq Epi: x / Non Sq Epi: x / Bacteria: x        
Patient noted to have variables. Comfortable with epidural in situ. Leaking clear fluid    Vital Signs Last 24 Hrs  T(C): 37.0 (28 Sep 2024 07:49), Max: 37.0 (28 Sep 2024 07:49)  T(F): 98.6 (28 Sep 2024 07:49), Max: 98.6 (28 Sep 2024 07:49)  HR: 92 (28 Sep 2024 10:30) (67 - 118)  BP: 132/71 (28 Sep 2024 10:26) (77/46 - 147/72)  BP(mean): --  RR: 19 (28 Sep 2024 06:47) (19 - 20)  SpO2: 98% (28 Sep 2024 10:30) (93% - 100%)    Parameters below as of 28 Sep 2024 00:10  Patient On (Oxygen Delivery Method): room air    Gen: NAD  Abd: soft, gravid, non tender  SVE: 5/50/-3, IUP placed    EFM: 130/mod nila/+accels/+nila  TOCO: q 5 min
PGY4 Note    S: Patient seen and examined at bedside for 5 min deceleration. No complaints.     Vital Signs Last 24 Hrs  T(C): 37.0 (28 Sep 2024 07:49), Max: 37.0 (28 Sep 2024 07:49)  T(F): 98.6 (28 Sep 2024 07:49), Max: 98.6 (28 Sep 2024 07:49)  HR: 84 (28 Sep 2024 15:42) (67 - 118)  BP: 116/59 (28 Sep 2024 15:42) (77/46 - 147/72)  BP(mean): --  RR: 19 (28 Sep 2024 06:47) (19 - 20)  SpO2: 97% (28 Sep 2024 15:20) (93% - 100%)    Parameters below as of 28 Sep 2024 00:10  Patient On (Oxygen Delivery Method): room air    EFM: 145/mod nila/prolonged 5 min decel  TOCO: q2-4 min  SVE:  5/50/-3 FSE replaced, IUPC in place    Labs:                        12.7   11.73 )-----------( 232      ( 27 Sep 2024 17:58 )             38.5             ABO RH Interpretation: A POS (24 @ 17:58)    Urinalysis Basic - ( 27 Sep 2024 19:36 )    Color: Yellow / Appearance: Clear / S.012 / pH: x  Gluc: x / Ketone: 80 mg/dL  / Bili: Negative / Urobili: 0.2 mg/dL   Blood: x / Protein: Negative mg/dL / Nitrite: Negative   Leuk Esterase: Negative / RBC: x / WBC x   Sq Epi: x / Non Sq Epi: x / Bacteria: x        Prenatal Syphilis Test: Nonreact (24 @ 17:58)    
Patient seen at the bedside, doing well. Pain is well controlled with PO pain medications. Ambulating and voiding without issue. Bleeding is not heavy. Denies fevers, chills, HA, SOB, pain in legs. Baby is in NICU for obs, not on antibiotics.     Vital Signs Last 24 Hrs  T(C): 36.4 (29 Sep 2024 08:44), Max: 37.9 (28 Sep 2024 18:54)  T(F): 97.6 (29 Sep 2024 08:44), Max: 100.22 (28 Sep 2024 18:54)  HR: 69 (29 Sep 2024 08:44) (67 - 136)  BP: 97/65 (29 Sep 2024 08:44) (77/46 - 151/79)  BP(mean): --  RR: 18 (29 Sep 2024 08:44) (18 - 18)  SpO2: 97% (29 Sep 2024 08:44) (71% - 100%)      Gen: NAD  Abd: soft, gravid, non tender, fundus firm below umbilicus  Pelvic: normal lochia    
Patient seen at the bedside, doing well. Pain is well controlled with PO pain medications. Ambulating and voiding without issue. Moderate lochia. Breastfeeding. Denies fevers, chills, HA, SOB, pain in legs.     Vital Signs Last 24 Hrs  T(C): 36.3 (30 Sep 2024 07:30), Max: 36.6 (29 Sep 2024 15:55)  T(F): 97.4 (30 Sep 2024 07:30), Max: 97.8 (29 Sep 2024 15:55)  HR: 69 (30 Sep 2024 07:30) (66 - 78)  BP: 124/81 (30 Sep 2024 07:30) (116/75 - 124/81)  BP(mean): --  RR: 18 (30 Sep 2024 07:30) (18 - 18)  SpO2: 98% (30 Sep 2024 07:30) (97% - 98%)    Parameters below as of 30 Sep 2024 07:30  Patient On (Oxygen Delivery Method): room air        Gen: NAD  Abd: soft, gravid, non tender, fundus firm below umbilicus  Pelvic: normal lochia

## 2024-10-07 DIAGNOSIS — O48.0 POST-TERM PREGNANCY: ICD-10-CM

## 2024-10-07 DIAGNOSIS — Z3A.41 41 WEEKS GESTATION OF PREGNANCY: ICD-10-CM

## 2024-11-05 NOTE — DISCHARGE NOTE OB - NSDCCONDITION_GEN_ALL_CORE
Stable
Gen: Awake, Alert, NAD, anxious  Head:  NC/AT  Eyes:  PERRL, EOMI, Conjunctiva pink, no scleral icterus  ENT:  no exudates, no erythema, uvula midline, TMs clear bilaterally, moist mucus membranes  Neck: supple, nontender, no meningismus, no JVD, trachea midline  Cardiac/CV:  S1 S2, RRR, no murmurs  Chest: nontender, no crepitus  Respiratory/Pulm:  CTAB, good air movement, normal resp effort, no wheezes/stridor/retractions/rales/rhonchi  Gastrointestinal/Abdomen:  Soft, some epigastric and upper abdominal tenderness to palpation, negative murphys sign, nondistended, no rebound/guarding  Ext:  warm, well perfused, moving all extremities spontaneously, no cyanosis, no erythema, no edema, distal pulses intact  Skin: intact, no rash, no petechiae, no ecchymosis  Neuro:  AAOx3, sensation intact, motor 5/5 x 4 extremities, clear speech

## 2025-07-11 NOTE — OB RN TRIAGE NOTE - FALL HARM RISK - PATIENT NEEDS ASSISTANCE
Post-Op Assessment Note    CV Status:  Stable  Pain Score: 0    Pain management: adequate       Mental Status:  Alert and awake   Hydration Status:  Euvolemic and stable   PONV Controlled:  Controlled   Airway Patency:  Patent     Post Op Vitals Reviewed: Yes    No anethesia notable event occurred.    Staff: CRNA           Last Filed PACU Vitals:  Vitals Value Taken Time   Temp     Pulse 82 07/11/25 15:13   /79 07/11/25 15:13   Resp 18 07/11/25 15:13   SpO2 97 % 07/11/25 15:13       Modified Meagan:     Vitals Value Taken Time   Activity 2 07/11/25 15:14   Respiration 2 07/11/25 15:14   Circulation 2 07/11/25 15:14   Consciousness 2 07/11/25 15:14   Oxygen Saturation 2 07/11/25 15:14     Modified Meagan Score: 10            
No assistance needed